# Patient Record
Sex: MALE | Race: BLACK OR AFRICAN AMERICAN | URBAN - METROPOLITAN AREA
[De-identification: names, ages, dates, MRNs, and addresses within clinical notes are randomized per-mention and may not be internally consistent; named-entity substitution may affect disease eponyms.]

---

## 2017-11-30 ENCOUNTER — APPOINTMENT (OUTPATIENT)
Dept: GENERAL RADIOLOGY | Facility: CLINIC | Age: 22
End: 2017-11-30
Attending: EMERGENCY MEDICINE
Payer: COMMERCIAL

## 2017-11-30 ENCOUNTER — HOSPITAL ENCOUNTER (EMERGENCY)
Facility: CLINIC | Age: 22
Discharge: HOME | End: 2017-11-30
Attending: EMERGENCY MEDICINE | Admitting: EMERGENCY MEDICINE
Payer: COMMERCIAL

## 2017-11-30 VITALS
SYSTOLIC BLOOD PRESSURE: 103 MMHG | WEIGHT: 145 LBS | BODY MASS INDEX: 19.22 KG/M2 | HEART RATE: 59 BPM | HEIGHT: 73 IN | RESPIRATION RATE: 18 BRPM | TEMPERATURE: 97.9 F | OXYGEN SATURATION: 100 % | DIASTOLIC BLOOD PRESSURE: 40 MMHG

## 2017-11-30 DIAGNOSIS — S80.01XA CONTUSION OF RIGHT KNEE, INITIAL ENCOUNTER: ICD-10-CM

## 2017-11-30 DIAGNOSIS — M25.561 ACUTE PAIN OF RIGHT KNEE: ICD-10-CM

## 2017-11-30 PROCEDURE — 29505 APPLICATION LONG LEG SPLINT: CPT | Mod: RT

## 2017-11-30 PROCEDURE — 99284 EMERGENCY DEPT VISIT MOD MDM: CPT | Mod: 25

## 2017-11-30 PROCEDURE — 73562 X-RAY EXAM OF KNEE 3: CPT | Mod: RT

## 2017-11-30 RX ORDER — IBUPROFEN 600 MG/1
600 TABLET, FILM COATED ORAL EVERY 6 HOURS PRN
Qty: 60 TABLET | Refills: 0 | Status: SHIPPED | OUTPATIENT
Start: 2017-11-30

## 2017-11-30 RX ORDER — IBUPROFEN 600 MG/1
600 TABLET, FILM COATED ORAL ONCE
Status: DISCONTINUED | OUTPATIENT
Start: 2017-11-30 | End: 2017-11-30 | Stop reason: HOSPADM

## 2017-11-30 ASSESSMENT — ENCOUNTER SYMPTOMS
ARTHRALGIAS: 1
NUMBNESS: 0
MYALGIAS: 1

## 2017-11-30 NOTE — LETTER
November 30, 2017      To Whom It May Concern:      Vira Laws was seen in our Emergency Department today, 11/30/17.  Please excuse him from weight bearing and repeated bending of his right knee for the next 3 days or until cleared by orthopedics.    Sincerely,        Marly Fowler MD

## 2017-11-30 NOTE — DISCHARGE INSTRUCTIONS
*Wear knee immobilizer as directed.  Use crutches with weight bearing as tolerated.  Rest, ice, elevation.  *Take medications as prescribed.  Ibuprofen and/or tylenol for pain. Continue your current medications.  *Follow-up with orthopedics in 1 week for re-evaluation.  *Return if you become worse in any way.      Lower Extremity Contusion  You have a contusion (bruise) of a lower extremity (leg, knee, ankle, foot, or toe). Symptoms include pain, swelling, and skin discoloration. No bones are broken. This injury may take from a few days to a few weeks to heal.  During that time, the bruise may change from reddish in color, to purple-blue, to green-yellow, to yellow-brown.  Home care    Unless another medicine was prescribed, you can take acetaminophen, ibuprofen, or naproxen to control pain. (If you have chronic liver or kidney disease or ever had a stomach ulcer or gastrointestinal bleeding, talk with your doctor before using these medicines.)    Elevate the injured area to reduce pain and swelling. As much as possible, sit or lie down with the injured area raised about the level of your heart. This is especially important during the first 48 hours.    Ice the injured area to help reduce pain and swelling. Wrap a cold source (ice pack or ice cubes in a plastic bag) in a thin towel. Apply to the bruised area for 20 minutes every 1 to 2 hours the first day. Continue this 3 to 4 times a day until the pain and swelling goes away.    If crutches have been advised, do not bear full weight on the injured leg until you can do so without pain. You may return to sports when you are able to put full weight and impact on the injured leg without pain.  Follow up  Follow up with your healthcare provider or our staff as advised. Call if you are not improving within the next 1 to 2 weeks.  When to seek medical advice   Call your healthcare provider right away if any of these occur:    Increased pain or swelling    Foot or toes  become cold, blue, numb or tingly    Signs of infection: Warmth, drainage, or increased redness or pain around the injury    Inability to move the injured area     Frequent bruising for unknown reasons  Date Last Reviewed: 2/1/2017 2000-2017 [employer]. [address] [place] [address]. All rights reserved. This information is not intended as a substitute for professional medical care. Always follow your healthcare professional's instructions.        Home  Back  SP    Sprain:Knee    A sprain is an injury to the ligaments or capsule that holds a joint together. There are no broken bones. Most sprains take three to six weeks to heal. If the ligament is completely torn (severe sprain), it can take months to recover from.  Most knee sprains are treated with a splint, knee immobilizer or elastic wrap for support. Severe sprains may require surgery.  Home Care:  1. Stay off the injured leg as much as possible until you can walk on it without pain. If you have a lot of pain with walking, crutches or a walker may be prescribed. (These can be rented or purchased at many pharmacies and surgical or orthopedic supply stores). Follow your doctor's advice regarding when to begin bearing weight on that leg.  2. Keep your leg elevated to reduce pain and swelling. When sleeping, place a pillow under the injured leg. When sitting, support the injured leg so it is level with your waist. This is very important during the first 48 hours.  3. Apply an ice pack (ice cubes in a plastic bag, wrapped in a towel) over the injured area for 20 minutes every 1-2 hours the first day. You can place the ice pack directly over the splint. If a Velcro knee immobilizer was applied, you can open this to apply the ice pack directly to the knee. Continue with ice packs 3-4 times a day for the next two days, then as needed for the relief of pain and swelling.  4. You may use acetaminophen (Tylenol) or ibuprofen (Motrin, Advil)  to control pain, unless another pain medicine was prescribed. [NOTE: If you have chronic liver or kidney disease or ever had a stomach ulcer or GI bleeding, talk with your doctor before using these medicines.]  5. If you were given a splint, keep it completely dry at all times. Bathe with your splint out of the water, protected with a large plastic bag, rubber-banded at the top end. If a fiberglass splint gets wet, you can dry it with a hair-dryer. If you have a Velcro knee immobilizer, you can remove this to bathe, unless told otherwise.  Follow Up  with your doctor, or as advised, within 1-2 weeks.  [NOTE: If X-rays were taken, they will be reviewed by a radiologist. You will be notified of any new findings that may affect your care.]  Get Prompt Medical Attention  if any of the following occur:    The plaster cast or splint becomes wet or soft    The fiberglass cast or splint remains wet for more than 24 hours    Pain or swelling increases    Toes become cold, blue, numb or tingly    [address] [place] [address]. All rights reserved. This information is not intended as a substitute for professional medical care. Always follow your healthcare professional's instructions.    Home  Back  SP  fr  pl  RU  VI  CH    Knee Pain, Possible Torn Meniscus    The  meniscus  is a tough cartilage pad that cushions the inside of the knee joint. It serves as a shock absorber and spreads the weight of your body evenly across the knee joint. This prevents excess wear and tear to the bones of that joint.  The most common causes of meniscal tears are due to injury (especially related to sports) and degenerative disease (as occurs with aging).  A meniscus tear commonly occurs during a twisting injury when the knee is bent. This causes pain, swelling, reduced movement of the knee and difficulty walking. There may be popping, clicking, joint locking or inability to completely straighten the  knee. Ligaments of the knee may also be injured.  Initial diagnosis of a torn meniscus is by physical exam and x-rays. In the case of an acute injury, the knee may be too painful to examine fully. A more accurate exam can be performed after the initial swelling goes down. An MRI (magnetic image scan) may be ordered to make a final diagnosis.  Initial treatment of a suspected meniscal injury is with ice and rest and preventing movement of the knee. A splint or Velcro knee immobilizer may be applied to protect the joint. Depending on the severity of the injury, surgery may be required. A cartilage injury may take 4-12 weeks to heal depending on the severity.  Home Care:  1. Stay off the injured leg as much as possible until you can walk on it without pain. If you have a lot of pain with walking, crutches or a walker may be prescribed. (These can be rented or purchased at many pharmacies and surgical or orthopedic supply stores). Follow your doctor's advice regarding when to begin bearing weight on that leg.  2. Keep your leg elevated to reduce pain and swelling. When sleeping, place a pillow under the injured leg. When sitting, support the injured leg so it is level with your waist. This is very important during the first 48 hours.  3. Apply an ice pack (ice cubes in a plastic bag, wrapped in a towel) over the injured area for 20 minutes every 1-2 hours the first day. You can place the ice pack directly over the splint. If a Velcro knee immobilizer was applied, you can open this to apply the ice pack directly to the knee. Continue with ice packs 3-4 times a day for the next two days, then as needed for the relief of pain and swelling.  4. You may use acetaminophen (Tylenol) or ibuprofen (Motrin, Advil) to control pain, unless another pain medicine was prescribed. [NOTE: If you have chronic liver or kidney disease or ever had a stomach ulcer, talk with your doctor before using these medicines.]  5. If you were given a  splint, keep it completely dry at all times. Bathe with your splint out of the water, protected with a large plastic bag, rubber-banded at the top end. If a fiberglass splint gets wet, you can dry it with a hair-dryer. If you have a Velcro knee immobilizer, you can remove this to bathe, unless told otherwise.  6. Check with your doctor before returning to sports or full work duties.  Follow Up  with your doctor, or as advised, within 1-2 weeks for another exam. Further testing may be required to assess the extent of your injury.  [NOTE: If X-rays were taken, they will be reviewed by a radiologist. You will be notified of any new findings that may affect your care.]  Get Prompt Medical Attention  if any of the following occur:    Toes or foot becomes swollen, cold, blue, numb or tingly    Pain or swelling increases over the knee or calf    Warmth or redness appears over the knee or calf    Shortness of breath or chest pain    Fever over 100.4 F (38.0 C)    [address]0388 [place] [address]. All rights reserved. This information is not intended as a substitute for professional medical care. Always follow your healthcare professional's instructions.      Opioid Medication Information    You have been given a prescription for an opioid (narcotic) pain medicine and/or have received a pain medicine while here in the Emergency Department. These medicines can make you drowsy or impaired. You must not drive, operate dangerous equipment, or engage in any other dangerous activities while taking these medications. If you drive while taking these medications, you could be arrested for DUI, or driving under the influence. Do not drink any alcohol while you are taking these medications.   Opioid pain medications can cause addiction. If you have a history of chemical dependency of any type, you are at a higher risk of becoming addicted to pain medications.  Only take these prescribed medications  to treat your pain when all other options have been tried. Take it for as short a time and as few doses as possible. Store your pain pills in a secure place, as they are frequently stolen and provide a dangerous opportunity for children or visitors in your house to start abusing these powerful medications. We will not replace any lost or stolen medicine.  As soon as your pain is better, you should flush all your remaining medication.   Many prescription pain medications contain Tylenol  (acetaminophen), including Vicodin , Tylenol #3 , Norco , Lortab , and Percocet .  You should not take any extra pills of Tylenol  if you are using these prescription medications or you can get very sick.  Do not ever take more than 4000 mg of acetaminophen in any 24 hour period.  All opioids tend to cause constipation. Drink plenty of water and eat foods that have a lot of fiber, such as fruits, vegetables, prune juice, apple juice and high fiber cereal.  Take a laxative if you don t move your bowels at least every other day. Miralax , Milk of Magnesia, Colace , or Senna  can be used to keep you regular.

## 2017-11-30 NOTE — ED PROVIDER NOTES
"  History     Chief Complaint:  Knee pain    HPI   Vira Laws is a 22 year old male who presents to the emergency department for evaluation of right knee pain. The patient reports right sided knee pain after playing soccer last night--he was kicked straight in the inside of his right knee by another . He is able to ambulate on his leg. He has been wearing a knee brace, and this has alleviated the pain \"a little bit.\" He has not taking any medication for his pain. The patient denies numbness down his leg.     Allergies:  No Known Drug Allergies     Medications:    The patient is currently on no regular medications.     Past Medical History:    History reviewed. No pertinent past medical history.    Past Surgical History:    History reviewed. No pertinent past surgical history.     Family History:    History reviewed. No pertinent family history.      Social History:  The patient was alone.  Smoking Status: Never  Smokeless Tobacco: Never  Alcohol Use: No      Review of Systems   Musculoskeletal: Positive for arthralgias and myalgias.   Neurological: Negative for numbness.   All other systems reviewed and are negative.    Physical Exam     First Vitals:  BP: 103/40  Pulse: 59  Temp: 97.9  F (36.6  C)  Resp: 16  Height: 185.9 cm (6' 1.2\")  Weight: 65.8 kg (145 lb)  SpO2: 100 %    Physical Exam  General: Well-nourished, no acute distress  Eyes: PERRL, conjunctivae pink no scleral icterus or conjunctival injection  ENT:  Moist mucus membranes  Respiratory:  No respiratory distress  CV: Normal rate   Skin: Warm, dry.  No rashes or petechiae  Musculoskeletal: No peripheral edema or calf tenderness.  Right knee with no apparent effusion.  Normal active ROM.  Normal distal sensation, temp, color and DP pulse.  +tender over medial aspect of right knee.   Neuro: Alert and oriented to person/place/time  Psychiatric: Normal affect      Emergency Department Course     Imaging:  Radiology findings were " communicated with the patient who voiced understanding of the findings.    Knee XR, 3 views, right:  IMPRESSION:  Two views of the knee were obtained and appear normal.   Report per radiology     Emergency Department Course:  Nursing notes and vitals reviewed.  The patient was sent for a Knee XR, 3 views, right while in the emergency department, results above.   1317: I performed an exam of the patient as documented above.   Findings and plan explained to the Patient. Patient discharged home with instructions regarding supportive care, medications, and reasons to return. The importance of close follow-up was reviewed. The patient was prescribed Ibuprofen.  I personally reviewed the imaging results with the Patient and answered all related questions prior to discharge.    Impression & Plan      Medical Decision Making:  Vira Laws is a 22 year old male who presents for evaluation of acute knee pain in the location where he was kicked.  There are no signs of fracture on knee xray.  There are no signs of a septic joint or bursitis.  I have low suspicion for an occult tibial plateau fracture based on exam, xray and mechanism. The patients neurovascular status is normal.  I suspect based on exam a bony and soft tissue injury.  Plan is for protected weightbearing, knee immobilizer RICE treatment and follow-up with primary or ortho in 5-7 days for reevaluation.      Diagnosis:    ICD-10-CM    1. Contusion of right knee, initial encounter S80.01XA    2. Acute pain of right knee M25.561        Disposition:  Discharged to home.    Discharge Medications:  New Prescriptions    IBUPROFEN (ADVIL/MOTRIN) 600 MG TABLET    Take 1 tablet (600 mg) by mouth every 6 hours as needed for moderate pain       Scribe Disclosure:  Yaz GUARDADO, am serving as a scribe at 1:17 PM on 11/30/2017 to document services personally performed by Marly Fowler MD based on my observations and the provider's statements to me.   11/30/2017   [facility]       Marly oFwler MD  11/30/17 [id number]

## 2017-11-30 NOTE — ED AVS SNAPSHOT
Emergency Department    [address] [facility] [address]    Phone:  [phone]    Fax:  [phone]                                       Vira Laws   MRN: [id number]    Department:   Emergency Department   Date of Visit:  11/30/2017           Patient Information     Date Of Birth          1995        Your diagnoses for this visit were:     Contusion of right knee, initial encounter     Acute pain of right knee        You were seen by Marly Fowler MD.      Follow-up Information     Follow up with Orthopaedics, [facility]. Schedule an appointment as soon as possible for a visit in 1 week.    Why:  As needed    Contact information:    [address] [address]  [phone]          Discharge Instructions       *Wear knee immobilizer as directed.  Use crutches with weight bearing as tolerated.  Rest, ice, elevation.  *Take medications as prescribed.  Ibuprofen and/or tylenol for pain. Continue your current medications.  *Follow-up with orthopedics in 1 week for re-evaluation.  *Return if you become worse in any way.      Lower Extremity Contusion  You have a contusion (bruise) of a lower extremity (leg, knee, ankle, foot, or toe). Symptoms include pain, swelling, and skin discoloration. No bones are broken. This injury may take from a few days to a few weeks to heal.  During that time, the bruise may change from reddish in color, to purple-blue, to green-yellow, to yellow-brown.  Home care    Unless another medicine was prescribed, you can take acetaminophen, ibuprofen, or naproxen to control pain. (If you have chronic liver or kidney disease or ever had a stomach ulcer or gastrointestinal bleeding, talk with your doctor before using these medicines.)    Elevate the injured area to reduce pain and swelling. As much as possible, sit or lie down with the injured area raised about the level of your heart. This is especially important during the first 48 hours.    Ice the  injured area to help reduce pain and swelling. Wrap a cold source (ice pack or ice cubes in a plastic bag) in a thin towel. Apply to the bruised area for 20 minutes every 1 to 2 hours the first day. Continue this 3 to 4 times a day until the pain and swelling goes away.    If crutches have been advised, do not bear full weight on the injured leg until you can do so without pain. You may return to sports when you are able to put full weight and impact on the injured leg without pain.  Follow up  Follow up with your healthcare provider or our staff as advised. Call if you are not improving within the next 1 to 2 weeks.  When to seek medical advice   Call your healthcare provider right away if any of these occur:    Increased pain or swelling    Foot or toes become cold, blue, numb or tingly    Signs of infection: Warmth, drainage, or increased redness or pain around the injury    Inability to move the injured area     Frequent bruising for unknown reasons  Date Last Reviewed: 2/1/2017 2000-2017 [employer]. [address] [place] [address]. All rights reserved. This information is not intended as a substitute for professional medical care. Always follow your healthcare professional's instructions.        Home  Back  SP    Sprain:Knee    A sprain is an injury to the ligaments or capsule that holds a joint together. There are no broken bones. Most sprains take three to six weeks to heal. If the ligament is completely torn (severe sprain), it can take months to recover from.  Most knee sprains are treated with a splint, knee immobilizer or elastic wrap for support. Severe sprains may require surgery.  Home Care:  1. Stay off the injured leg as much as possible until you can walk on it without pain. If you have a lot of pain with walking, crutches or a walker may be prescribed. (These can be rented or purchased at many pharmacies and surgical or orthopedic supply stores). Follow your doctor's  advice regarding when to begin bearing weight on that leg.  2. Keep your leg elevated to reduce pain and swelling. When sleeping, place a pillow under the injured leg. When sitting, support the injured leg so it is level with your waist. This is very important during the first 48 hours.  3. Apply an ice pack (ice cubes in a plastic bag, wrapped in a towel) over the injured area for 20 minutes every 1-2 hours the first day. You can place the ice pack directly over the splint. If a Velcro knee immobilizer was applied, you can open this to apply the ice pack directly to the knee. Continue with ice packs 3-4 times a day for the next two days, then as needed for the relief of pain and swelling.  4. You may use acetaminophen (Tylenol) or ibuprofen (Motrin, Advil) to control pain, unless another pain medicine was prescribed. [NOTE: If you have chronic liver or kidney disease or ever had a stomach ulcer or GI bleeding, talk with your doctor before using these medicines.]  5. If you were given a splint, keep it completely dry at all times. Bathe with your splint out of the water, protected with a large plastic bag, rubber-banded at the top end. If a fiberglass splint gets wet, you can dry it with a hair-dryer. If you have a Velcro knee immobilizer, you can remove this to bathe, unless told otherwise.  Follow Up  with your doctor, or as advised, within 1-2 weeks.  [NOTE: If X-rays were taken, they will be reviewed by a radiologist. You will be notified of any new findings that may affect your care.]  Get Prompt Medical Attention  if any of the following occur:    The plaster cast or splint becomes wet or soft    The fiberglass cast or splint remains wet for more than 24 hours    Pain or swelling increases    Toes become cold, blue, numb or tingly    8671-7638 [facility], [address] [place] [address], [place] [address]. All rights reserved. This information is not intended as a substitute for professional medical care. Always  follow your healthcare professional's instructions.    Home  Back  SP  fr  pl  RU  VI  CH    Knee Pain, Possible Torn Meniscus    The  meniscus  is a tough cartilage pad that cushions the inside of the knee joint. It serves as a shock absorber and spreads the weight of your body evenly across the knee joint. This prevents excess wear and tear to the bones of that joint.  The most common causes of meniscal tears are due to injury (especially related to sports) and degenerative disease (as occurs with aging).  A meniscus tear commonly occurs during a twisting injury when the knee is bent. This causes pain, swelling, reduced movement of the knee and difficulty walking. There may be popping, clicking, joint locking or inability to completely straighten the knee. Ligaments of the knee may also be injured.  Initial diagnosis of a torn meniscus is by physical exam and x-rays. In the case of an acute injury, the knee may be too painful to examine fully. A more accurate exam can be performed after the initial swelling goes down. An MRI (magnetic image scan) may be ordered to make a final diagnosis.  Initial treatment of a suspected meniscal injury is with ice and rest and preventing movement of the knee. A splint or Velcro knee immobilizer may be applied to protect the joint. Depending on the severity of the injury, surgery may be required. A cartilage injury may take 4-12 weeks to heal depending on the severity.  Home Care:  1. Stay off the injured leg as much as possible until you can walk on it without pain. If you have a lot of pain with walking, crutches or a walker may be prescribed. (These can be rented or purchased at many pharmacies and surgical or orthopedic supply stores). Follow your doctor's advice regarding when to begin bearing weight on that leg.  2. Keep your leg elevated to reduce pain and swelling. When sleeping, place a pillow under the injured leg. When sitting, support the injured leg so it is  level with your waist. This is very important during the first 48 hours.  3. Apply an ice pack (ice cubes in a plastic bag, wrapped in a towel) over the injured area for 20 minutes every 1-2 hours the first day. You can place the ice pack directly over the splint. If a Velcro knee immobilizer was applied, you can open this to apply the ice pack directly to the knee. Continue with ice packs 3-4 times a day for the next two days, then as needed for the relief of pain and swelling.  4. You may use acetaminophen (Tylenol) or ibuprofen (Motrin, Advil) to control pain, unless another pain medicine was prescribed. [NOTE: If you have chronic liver or kidney disease or ever had a stomach ulcer, talk with your doctor before using these medicines.]  5. If you were given a splint, keep it completely dry at all times. Bathe with your splint out of the water, protected with a large plastic bag, rubber-banded at the top end. If a fiberglass splint gets wet, you can dry it with a hair-dryer. If you have a Velcro knee immobilizer, you can remove this to bathe, unless told otherwise.  6. Check with your doctor before returning to sports or full work duties.  Follow Up  with your doctor, or as advised, within 1-2 weeks for another exam. Further testing may be required to assess the extent of your injury.  [NOTE: If X-rays were taken, they will be reviewed by a radiologist. You will be notified of any new findings that may affect your care.]  Get Prompt Medical Attention  if any of the following occur:    Toes or foot becomes swollen, cold, blue, numb or tingly    Pain or swelling increases over the knee or calf    Warmth or redness appears over the knee or calf    Shortness of breath or chest pain    Fever over 100.4 F (38.0 C)    7141-0889 [facility], [address] [place] [address], [place] [address]. All rights reserved. This information is not intended as a substitute for professional medical care. Always follow your healthcare  professional's instructions.      Opioid Medication Information    You have been given a prescription for an opioid (narcotic) pain medicine and/or have received a pain medicine while here in the Emergency Department. These medicines can make you drowsy or impaired. You must not drive, operate dangerous equipment, or engage in any other dangerous activities while taking these medications. If you drive while taking these medications, you could be arrested for DUI, or driving under the influence. Do not drink any alcohol while you are taking these medications.   Opioid pain medications can cause addiction. If you have a history of chemical dependency of any type, you are at a higher risk of becoming addicted to pain medications.  Only take these prescribed medications to treat your pain when all other options have been tried. Take it for as short a time and as few doses as possible. Store your pain pills in a secure place, as they are frequently stolen and provide a dangerous opportunity for children or visitors in your house to start abusing these powerful medications. We will not replace any lost or stolen medicine.  As soon as your pain is better, you should flush all your remaining medication.   Many prescription pain medications contain Tylenol  (acetaminophen), including Vicodin , Tylenol #3 , Norco , Lortab , and Percocet .  You should not take any extra pills of Tylenol  if you are using these prescription medications or you can get very sick.  Do not ever take more than 4000 mg of acetaminophen in any 24 hour period.  All opioids tend to cause constipation. Drink plenty of water and eat foods that have a lot of fiber, such as fruits, vegetables, prune juice, apple juice and high fiber cereal.  Take a laxative if you don t move your bowels at least every other day. Miralax , Milk of Magnesia, Colace , or Senna  can be used to keep you regular.                  24 Hour Appointment Hotline       To make an  appointment at any [facility] clinic, call [phone] ([phone]). If you don't have a family doctor or clinic, we will help you find one. [facility] are conveniently located to serve the needs of you and your family.             Review of your medicines      START taking        Dose / Directions Last dose taken    ibuprofen 600 MG tablet   Commonly known as:  ADVIL/MOTRIN   Dose:  600 mg   Quantity:  60 tablet        Take 1 tablet (600 mg) by mouth every 6 hours as needed for moderate pain   Refills:  0                Prescriptions were sent or printed at these locations (1 Prescription)                   Other Prescriptions                Printed at Department/Unit printer (1 of 1)         ibuprofen (ADVIL/MOTRIN) 600 MG tablet                Procedures and tests performed during your visit     Knee XR, 3 views, right      Orders Needing Specimen Collection     None      Pending Results     Date and Time Order Name Status Description    11/30/2017 1242 Knee XR, 3 views, right Preliminary             Pending Culture Results     No orders found from 11/28/2017 to 12/1/2017.            Pending Results Instructions     If you had any lab results that were not finalized at the time of your Discharge, you can call the ED Lab Result RN at [phone]. You will be contacted by this team for any positive Lab results or changes in treatment. The nurses are available 7 days a week from 10A to 6:30P.  You can leave a message 24 hours per day and they will return your call.        Test Results From Your Hospital Stay        11/30/2017 12:53 PM      Narrative     RIGHT KNEE THREE VIEWS  11/30/2017 12:51 PM    HISTORY:  Medial knee pain, kicked in leg while playing soccer.     COMPARISON:  None.        Impression     IMPRESSION:  Two views of the knee were obtained and appear normal.                 Clinical Quality Measure: Blood Pressure Screening     Your blood pressure was checked while you were in the  "emergency department today. The last reading we obtained was  BP: 103/40 . Please read the guidelines below about what these numbers mean and what you should do about them.  If your systolic blood pressure (the top number) is less than 120 and your diastolic blood pressure (the bottom number) is less than 80, then your blood pressure is normal. There is nothing more that you need to do about it.  If your systolic blood pressure (the top number) is 120-139 or your diastolic blood pressure (the bottom number) is 80-89, your blood pressure may be higher than it should be. You should have your blood pressure rechecked within a year by a primary care provider.  If your systolic blood pressure (the top number) is 140 or greater or your diastolic blood pressure (the bottom number) is 90 or greater, you may have high blood pressure. High blood pressure is treatable, but if left untreated over time it can put you at risk for heart attack, stroke, or kidney failure. You should have your blood pressure rechecked by a primary care provider within the next 4 weeks.  If your provider in the emergency department today gave you specific instructions to follow-up with your doctor or provider even sooner than that, you should follow that instruction and not wait for up to 4 weeks for your follow-up visit.        Thank you for choosing [facility]       Thank you for choosing [facility] for your care. Our goal is always to provide you with excellent care. Hearing back from our patients is one way we can continue to improve our services. Please take a few minutes to complete the written survey that you may receive in the mail after you visit with us. Thank you!        [facility] Information     [employer] lets you send messages to your doctor, view your test results, renew your prescriptions, schedule appointments and more. To sign up, go to [email or web address] . Click on \"Log in\" on the left side of the screen, which will take you to the " "Welcome page. Then click on \"Sign up Now\" on the right side of the page.     You will be asked to enter the access code listed below, as well as some personal information. Please follow the directions to create your username and password.     Your access code is: [email or web address]  Expires: 2018  1:36 PM     Your access code will  in 90 days. If you need help or a new code, please call your [place] clinic or [phone].        Care EveryWhere ID     This is your Care EveryWhere ID. This could be used by other organizations to access your [facility] medical records  [id number]        Equal Access to Services     VAUGHN RICHARDSON : Luis Vance, jose nelson, brenton strauss, donato villela. So [facility] [phone].    ATENCIÓN: Si habla español, tiene a dia disposición servicios gratuitos de asistencia lingüística. Llame al [phone].    We comply with applicable federal civil rights laws and [place] laws. We do not discriminate on the basis of race, color, national origin, age, disability, sex, sexual orientation, or gender identity.            After Visit Summary       This is your record. Keep this with you and show to your community pharmacist(s) and doctor(s) at your next visit.                  "

## 2017-11-30 NOTE — ED AVS SNAPSHOT
Emergency Department    [address] [place] [address]    Phone:  [phone]    Fax:  [phone]                                       Vira Laws   MRN: [id number]    Department:   Emergency Department   Date of Visit:  11/30/2017           After Visit Summary Signature Page     I have received my discharge instructions, and my questions have been answered. I have discussed any challenges I see with this plan with the nurse or doctor.    ..........................................................................................................................................  Patient/Patient Representative Signature      ..........................................................................................................................................  Patient Representative Print Name and Relationship to Patient    ..................................................               ................................................  Date                                            Time    ..........................................................................................................................................  Reviewed by Signature/Title    ...................................................              ..............................................  Date                                                            Time